# Patient Record
Sex: FEMALE | Race: WHITE | NOT HISPANIC OR LATINO | URBAN - METROPOLITAN AREA
[De-identification: names, ages, dates, MRNs, and addresses within clinical notes are randomized per-mention and may not be internally consistent; named-entity substitution may affect disease eponyms.]

---

## 2021-02-03 ENCOUNTER — APPOINTMENT (OUTPATIENT)
Dept: URBAN - METROPOLITAN AREA CLINIC 285 | Age: 86
Setting detail: DERMATOLOGY
End: 2021-02-03

## 2021-02-03 DIAGNOSIS — D485 NEOPLASM OF UNCERTAIN BEHAVIOR OF SKIN: ICD-10-CM

## 2021-02-03 PROBLEM — D48.5 NEOPLASM OF UNCERTAIN BEHAVIOR OF SKIN: Status: ACTIVE | Noted: 2021-02-03

## 2021-02-03 PROCEDURE — OTHER BIOPSY BY SHAVE METHOD: OTHER

## 2021-02-03 PROCEDURE — 11102 TANGNTL BX SKIN SINGLE LES: CPT

## 2021-02-03 PROCEDURE — OTHER MIPS QUALITY: OTHER

## 2021-02-03 PROCEDURE — 11103 TANGNTL BX SKIN EA SEP/ADDL: CPT

## 2021-02-03 PROCEDURE — 99202 OFFICE O/P NEW SF 15 MIN: CPT | Mod: 25

## 2021-02-03 ASSESSMENT — LOCATION ZONE DERM: LOCATION ZONE: FACE

## 2021-02-03 ASSESSMENT — LOCATION SIMPLE DESCRIPTION DERM: LOCATION SIMPLE: LEFT FOREHEAD

## 2021-02-03 ASSESSMENT — LOCATION DETAILED DESCRIPTION DERM
LOCATION DETAILED: LEFT LATERAL FOREHEAD
LOCATION DETAILED: LEFT SUPERIOR FOREHEAD
LOCATION DETAILED: LEFT FOREHEAD

## 2021-02-03 NOTE — PROCEDURE: BIOPSY BY SHAVE METHOD
Additional Anesthesia Volume In Cc (Will Not Render If 0): 0
Bill [id number] For Specimen Handling/Conveyance To Laboratory?: no
Electrodesiccation And Curettage Text: The wound bed was treated with electrodesiccation and curettage after the biopsy was performed.
Electrodesiccation Text: The wound bed was treated with electrodesiccation after the biopsy was performed.
Anesthesia Type: 1% lidocaine with epinephrine
Type Of Destruction Used: Curettage
Was A Bandage Applied: Yes
Post-Care Instructions: I reviewed with the patient in detail post-care instructions. Patient is to keep the biopsy site dry overnight, and then apply Vaseline 1-2 times daily until healed. Wound can be cleaned once daily with soap and water.
Biopsy Type: H and E
Wound Care: Petrolatum
Dressing: bandage
Hemostasis: Drysol
Billing Type: Third-Party Bill
Silver Nitrate Text: The wound bed was treated with silver nitrate after the biopsy was performed.
Biopsy Method: Dermablade
Information: Selecting Yes will display possible errors in your note based on the variables you have selected. This validation is only offered as a suggestion for you. PLEASE NOTE THAT THE VALIDATION TEXT WILL BE REMOVED WHEN YOU FINALIZE YOUR NOTE. IF YOU WANT TO FAX A PRELIMINARY NOTE YOU WILL NEED TO TOGGLE THIS TO 'NO' IF YOU DO NOT WANT IT IN YOUR FAXED NOTE.
Notification Instructions: Patient will be notified of biopsy results. However, patient instructed to call the office if not contacted within 2 weeks.
Detail Level: Detailed
Curettage Text: The wound bed was treated with curettage after the biopsy was performed.
Cryotherapy Text: The wound bed was treated with cryotherapy after the biopsy was performed.
Consent: Verbal consent was obtained and risks were reviewed including but not limited to scarring, infection, bleeding, scabbing, incomplete removal, nerve damage and allergy to anesthesia.
Anesthesia Volume In Cc (Will Not Render If 0): 0.5
Depth Of Biopsy: dermis

## 2021-03-30 ENCOUNTER — APPOINTMENT (OUTPATIENT)
Dept: URBAN - METROPOLITAN AREA CLINIC 285 | Age: 86
Setting detail: DERMATOLOGY
End: 2021-04-03

## 2021-03-30 PROBLEM — C44.319 BASAL CELL CARCINOMA OF SKIN OF OTHER PARTS OF FACE: Status: ACTIVE | Noted: 2021-03-30

## 2021-03-30 PROCEDURE — A4550 SURGICAL TRAYS: HCPCS

## 2021-03-30 PROCEDURE — OTHER BIOPSY BY SHAVE METHOD: OTHER

## 2021-03-30 PROCEDURE — OTHER PATIENT SPECIFIC COUNSELING: OTHER

## 2021-03-30 PROCEDURE — 11102 TANGNTL BX SKIN SINGLE LES: CPT

## 2021-03-30 PROCEDURE — 99212 OFFICE O/P EST SF 10 MIN: CPT | Mod: 25

## 2021-03-30 PROCEDURE — OTHER COUNSELING: OTHER

## 2021-03-30 NOTE — PROCEDURE: PATIENT SPECIFIC COUNSELING
Detail Level: Zone
Explained the etiology of the condition in detail with the patient and her granddaughter Ellen. Due to the extensiveness,size and tumor involvement of each of these location, I discussed alternate treatment options. Due to the close proximity of each skin cancer, I recommend that the patient be referred for radiation treatment. I informed the patient and the granddaughter that this would be the best option to treat these at the same visits. I explained that should she wish to proceed with Mohs surgery today that only one site can be done at one time and allow to heal before performing Mohs on the other sites, which in turn will take at least two to three separate appointments. The patient and granddaughter has express their understanding and wish to proceed with radiation treatment. The patient will be referred for radiation treatment and will be contacted with the appointment date and time.
I recommend that a re biopsy be done to this location as the previous biopsy showed an actinic keratosis. Clinically this areas is suspicious for skin cancer.\\n\\nGiven the extent of three likely BCC (2 biopsy proven , one pending re- biopsy), I am recommending consideration be given to XRT consultation, as surgical treatment by excision, Mohs and even EDC will be extensive and pt would prefer non surgical options.  Granddaughter was present and in agreement with plan. PL

## 2021-03-30 NOTE — PROCEDURE: BIOPSY BY SHAVE METHOD
Post-Care Instructions: 1. A band-aid has been placed over the biopsy site. Keep the area clean and dry until your next bath or shower, preferably 12-24 hours from the time of the biopsy.\\n2. Remove the band-aid and gently clean once or twice a day with mild soap and water. Apply a thin layer of Vaseline or Aquaphor ointment and cover with a bandage once or twice daily. We do not recommend products containing neomycin (such as Neosporin or triple antibiotic ointment) or polysporin because some patients can develop an allergy to these products, even with prior, uncomplicated use. The goal is to keep the wound moist and prevent a scab.\\n3. It is normal to have a little pinkness (1/8 inch) around the area of the biopsy. The center of the biopsy site may appear slightly whitish or yellow in color, similar to a moist scab in appearance. As the wound continues to heal the \"pink\" will overtake the whitish center. The pink will eventually fade.\\n4. Please note, lower extremity (leg) wounds of any sort will require more time (often weeks to months) to heal completely. Often a purple delmy will remain for an indefinite period of time. In addition, a certain amount of swelling is normal when a biopsy is performed on any leg or finger. Elevation will assist in minimizing your swelling. Call us if you\\nare concerned.\\n5. If bleeding occurs after you leave the office, hold steady pressure for 15-20 minutes without \"peeking\" to stop it. If the bleeding continues, call our office [phone]. Ask to have the doctor paged if after hours.\\n6. To optimize wound healing avoid: smoking, direct sun exposure, and strenuous exercise.\\n\\n* Call the office if you develop: pain, fever, drainage of pus, or severe itching.\\n* As a policy, a patient with benign results will not receive a call on the pathology results, unless we specifically discussed a call back at the time of your visit, i.e. \"no news is good news.\"\\n\\n**ALL BIOPSY RESULTS REQUIRING FOLLOW UP TREATMENT BEYOND A YEARLY FOLLOW UP WILL BE COMMUNICATED TO THE PATIENT WITHIN TWO WEEKS. WITH THE OCCASIONAL EXCEPTION OF SPECIMENS THAT REQUIRE ADDITIONAL SPECIALIZED STAINS, TESTS, OR CONSULTATIONS. IN THOSE RARE SITUATIONS, YOUR RESULTS MAY BE DELAYED, AND WE WILL MAKE EVERY EFFORT TO KEEP YOU INFORMED UNTIL A FINAL RESULT HAS BEEN\\nRECEIVED. Post-Care Instructions: 1. A band-aid has been placed over the biopsy site. Keep the area clean and dry until your next bath or shower, preferably 12-24 hours from the time of the biopsy.\\n2. Remove the band-aid and gently clean once or twice a day with mild soap and water. Apply a thin layer of Vaseline or Aquaphor ointment and cover with a bandage once or twice daily. We do not recommend products containing neomycin (such as Neosporin or triple antibiotic ointment) or polysporin because some patients can develop an allergy to these products, even with prior, uncomplicated use. The goal is to keep the wound moist and prevent a scab.\\n3. It is normal to have a little pinkness (1/8 inch) around the area of the biopsy. The center of the biopsy site may appear slightly whitish or yellow in color, similar to a moist scab in appearance. As the wound continues to heal the \"pink\" will overtake the whitish center. The pink will eventually fade.\\n4. Please note, lower extremity (leg) wounds of any sort will require more time (often weeks to months) to heal completely. Often a purple delmy will remain for an indefinite period of time. In addition, a certain amount of swelling is normal when a biopsy is performed on any leg or finger. Elevation will assist in minimizing your swelling. Call us if you\\nare concerned.\\n5. If bleeding occurs after you leave the office, hold steady pressure for 15-20 minutes without \"peeking\" to stop it. If the bleeding continues, call our office [phone]. Ask to have the doctor paged if after hours.\\n6. To optimize wound healing avoid: smoking, direct sun exposure, and strenuous exercise.\\n\\n* Call the office if you develop: pain, fever, drainage of pus, or severe itching.\\n* As a policy, a patient with benign results will not receive a call on the pathology results, unless we specifically discussed a call back at the time of your visit, i.e. \"no news is good news.\"\\n\\n**ALL BIOPSY RESULTS REQUIRING FOLLOW UP TREATMENT BEYOND A YEARLY FOLLOW UP WILL BE COMMUNICATED TO THE PATIENT WITHIN TWO WEEKS. WITH THE OCCASIONAL EXCEPTION OF SPECIMENS THAT REQUIRE ADDITIONAL SPECIALIZED STAINS, TESTS, OR CONSULTATIONS. IN THOSE RARE SITUATIONS, YOUR RESULTS MAY BE DELAYED, AND WE WILL MAKE EVERY EFFORT TO KEEP YOU INFORMED UNTIL A FINAL RESULT HAS BEEN\\nRECEIVED.